# Patient Record
Sex: MALE | Race: WHITE | HISPANIC OR LATINO | Employment: UNEMPLOYED | URBAN - METROPOLITAN AREA
[De-identification: names, ages, dates, MRNs, and addresses within clinical notes are randomized per-mention and may not be internally consistent; named-entity substitution may affect disease eponyms.]

---

## 2020-06-03 ENCOUNTER — HOSPITAL ENCOUNTER (EMERGENCY)
Facility: HOSPITAL | Age: 55
Discharge: HOME | End: 2020-06-03
Attending: EMERGENCY MEDICINE

## 2020-06-03 VITALS
OXYGEN SATURATION: 99 % | WEIGHT: 163 LBS | SYSTOLIC BLOOD PRESSURE: 143 MMHG | HEART RATE: 96 BPM | RESPIRATION RATE: 16 BRPM | BODY MASS INDEX: 26.2 KG/M2 | TEMPERATURE: 99 F | HEIGHT: 66 IN | DIASTOLIC BLOOD PRESSURE: 78 MMHG

## 2020-06-03 DIAGNOSIS — M79.673 FOOT PAIN: ICD-10-CM

## 2020-06-03 DIAGNOSIS — S99.921A INJURY OF RIGHT FOOT, INITIAL ENCOUNTER: Primary | ICD-10-CM

## 2020-06-03 DIAGNOSIS — M25.579 ANKLE PAIN: ICD-10-CM

## 2020-06-03 LAB
HCV AB SERPL QL IA: NEGATIVE
HIV 1+2 AB+HIV1 P24 AG SERPL QL IA: NEGATIVE

## 2020-06-03 PROCEDURE — 86703 HIV-1/HIV-2 1 RESULT ANTBDY: CPT

## 2020-06-03 PROCEDURE — 36415 COLL VENOUS BLD VENIPUNCTURE: CPT

## 2020-06-03 PROCEDURE — 86803 HEPATITIS C AB TEST: CPT

## 2020-06-03 PROCEDURE — 25000003 PHARM REV CODE 250: Performed by: NURSE PRACTITIONER

## 2020-06-03 PROCEDURE — 29515 APPLICATION SHORT LEG SPLINT: CPT | Mod: RT

## 2020-06-03 PROCEDURE — 99283 EMERGENCY DEPT VISIT LOW MDM: CPT | Mod: 25

## 2020-06-03 RX ORDER — HYDROCODONE BITARTRATE AND ACETAMINOPHEN 10; 325 MG/1; MG/1
1 TABLET ORAL EVERY 6 HOURS PRN
Status: DISCONTINUED | OUTPATIENT
Start: 2020-06-03 | End: 2020-06-03 | Stop reason: HOSPADM

## 2020-06-03 RX ORDER — TRAMADOL HYDROCHLORIDE 50 MG/1
50 TABLET ORAL EVERY 6 HOURS PRN
Qty: 12 TABLET | Refills: 0 | Status: SHIPPED | OUTPATIENT
Start: 2020-06-03

## 2020-06-03 RX ADMIN — HYDROCODONE BITARTRATE AND ACETAMINOPHEN 1 TABLET: 10; 325 TABLET ORAL at 01:06

## 2020-06-03 NOTE — ED NOTES
Leg splint placed by Ko SHAHID & pt was educated on use of crutches. Pt demonstrated good understanding and ability to walk properly with crutches.

## 2020-06-03 NOTE — ED PROVIDER NOTES
HISTORY     Chief Complaint   Patient presents with    Foot Injury     pt reports sliding off roof, and landed in a standing position but R foot landed on big rock. swelling noted,      Review of patient's allergies indicates:  No Known Allergies     HPI   The history is provided by the patient. No  was used.   Foot Injury    The incident occurred at home. The injury mechanism was a fall (pt reports fell 8 foot and landed on feet and is having right foot and ankle pain). The incident occurred two days ago. Pain location: right foot and right ankle. The quality of the pain is described as aching. The pain is at a severity of 9/10. The pain has been constant since onset. Associated symptoms include inability to bear weight and loss of motion (decreased rom right foot and ankle). Pertinent negatives include no numbness, no muscle weakness, no loss of sensation and no tingling. He reports no foreign bodies present. He has tried nothing for the symptoms.        PCP: Primary Doctor No     Past Medical History:  History reviewed. No pertinent past medical history.     Past Surgical History:  Past Surgical History:   Procedure Laterality Date    KNEE ARTHROSCOPY Left         Family History:  History reviewed. No pertinent family history.     Social History:  Social History     Tobacco Use    Smoking status: Current Every Day Smoker     Types: Cigarettes   Substance and Sexual Activity    Alcohol use: Yes     Frequency: 2-3 times a week    Drug use: Yes     Types: Marijuana     Comment: last used 1 week ago    Sexual activity: Not Currently         ROS   Review of Systems   Constitutional: Negative for chills, fatigue and fever.   HENT: Negative for congestion and sore throat.    Respiratory: Negative for chest tightness and shortness of breath.    Cardiovascular: Negative for chest pain.   Gastrointestinal: Negative for nausea.   Genitourinary: Negative for dysuria.   Musculoskeletal: Positive  for arthralgias (right foot and right ankle). Negative for back pain.   Skin: Negative for rash.   Neurological: Negative for dizziness, tingling, weakness and numbness.   Hematological: Does not bruise/bleed easily.   Psychiatric/Behavioral: Negative for agitation.       PHYSICAL EXAM     Initial Vitals [06/03/20 1142]   BP Pulse Resp Temp SpO2   (!) 143/78 104 18 98.6 °F (37 °C) 100 %      MAP       --           Physical Exam    Nursing note and vitals reviewed.  Constitutional: He appears well-developed and well-nourished. He is not diaphoretic. No distress.   HENT:   Head: Normocephalic and atraumatic.   Right Ear: External ear normal.   Left Ear: External ear normal.   Mouth/Throat: Oropharynx is clear and moist. No oropharyngeal exudate.   Eyes: Conjunctivae are normal. Right eye exhibits no discharge. Left eye exhibits no discharge.   Neck: Normal range of motion. Neck supple.   Cardiovascular: Normal rate, regular rhythm and normal heart sounds.   Pulmonary/Chest: Breath sounds normal. No respiratory distress. He has no wheezes. He has no rhonchi. He has no rales.   Abdominal: Soft. Bowel sounds are normal.   Musculoskeletal:   Moderate swelling to right ankle and dorsal aspect right foot. Tenderness to lateral and medial right ankle. Ankle dorsiflexion and planar flexion intact. DP and PT pulses 2+. Cap refill <2sec. RLE NVI   Neurological: He is alert and oriented to person, place, and time. He has normal strength.   Skin: Skin is warm and dry.   Psychiatric: He has a normal mood and affect. His behavior is normal. Thought content normal.          ED COURSE   Orthopedic Injury  Date/Time: 6/3/2020 12:54 PM  Performed by: Oren Montoya NP  Authorized by: Alisia Kelly, DO     Consent Done?:  Yes  Universal Protocol:     Verbal consent obtained?: Yes      Written consent obtained?: No      Risks and benefits: Risks, benefits and alternatives were discussed      Consent given by:  Patient     "Patient states understanding of procedure being performed: Yes      Patient's understanding of procedure matches consent: Yes      Procedure consent matches procedure scheduled: Yes      Patient identity confirmed:  Name  Injury:     Injury location: right foot and right ankle.      Pre-procedure assessment:     Neurovascular status: Neurovascularly intact      Distal perfusion: normal      Neurological function: normal      Range of motion: normal      Local anesthesia used?: No      Patient sedated?: No        Selections made in this section will also lock the Injury type section above.:     Immobilization:  Splint    Splint type:  Short leg (right)    Supplies used:  Elastic bandage, cotton padding and Ortho-Glass  Post-procedure assessment:     Neurovascular status: Neurovascularly intact      Distal perfusion: normal      Neurological function: normal      Range of motion: normal      Patient tolerance:  Patient tolerated the procedure well with no immediate complications      ED ONGOING VITALS:  Vitals:    06/03/20 1142   BP: (!) 143/78   Pulse: 104   Resp: 18   Temp: 98.6 °F (37 °C)   TempSrc: Oral   SpO2: 100%   Weight: 73.9 kg (163 lb)   Height: 5' 6" (1.676 m)         ABNORMAL LAB VALUES:  Labs Reviewed   HIV 1 / 2 ANTIBODY   HEPATITIS C ANTIBODY         ALL LAB VALUES:  none    RADIOLOGY STUDIES:  Imaging Results          X-Ray Ankle Complete Right (Final result)  Result time 06/03/20 12:41:55    Final result by Jair Schroeder MD (06/03/20 12:41:55)                 Impression:      No acute fracture or dislocation.  See findings above.      Electronically signed by: Jair Schroeder MD  Date:    06/03/2020  Time:    12:41             Narrative:    EXAMINATION:  XR ANKLE COMPLETE 3 VIEW RIGHT; XR FOOT COMPLETE 3 VIEW RIGHT    CLINICAL HISTORY:  XR ANKLE COMPLETE 3 VIEW RIGHT; XR FOOT COMPLETE 3 VIEW RIGHTPain in unspecified ankle and joints of unspecified foot; Pain in unspecified " foot    COMPARISON:  None    FINDINGS:  Three views of the right ankle and foot were obtained.    No evidence of acute fracture or dislocation.  Bony mineralization is normal.  Moderate soft tissue swelling.  Joint effusion suspected.  Ankle mortise is intact.                               X-Ray Foot Complete Right (Final result)  Result time 06/03/20 12:41:55    Final result by Jair Schroeder MD (06/03/20 12:41:55)                 Impression:      No acute fracture or dislocation.  See findings above.      Electronically signed by: Jair Schroeder MD  Date:    06/03/2020  Time:    12:41             Narrative:    EXAMINATION:  XR ANKLE COMPLETE 3 VIEW RIGHT; XR FOOT COMPLETE 3 VIEW RIGHT    CLINICAL HISTORY:  XR ANKLE COMPLETE 3 VIEW RIGHT; XR FOOT COMPLETE 3 VIEW RIGHTPain in unspecified ankle and joints of unspecified foot; Pain in unspecified foot    COMPARISON:  None    FINDINGS:  Three views of the right ankle and foot were obtained.    No evidence of acute fracture or dislocation.  Bony mineralization is normal.  Moderate soft tissue swelling.  Joint effusion suspected.  Ankle mortise is intact.                                          The above vital signs and test results have been reviewed by the emergency provider.     ED Medications:  There are no discharge medications for this patient.    Discharge Medications:  New Prescriptions    TRAMADOL (ULTRAM) 50 MG TABLET    Take 1 tablet (50 mg total) by mouth every 6 (six) hours as needed for Pain.      Follow-up Information     Schedule an appointment as soon as possible for a visit  with [facility].    Specialty:  Orthopedics  Contact information:  [id number] [facility] [id number] [phone]                1:02 PM    I discussed with patient and/or family/caretaker that evaluation in the ED does not suggest any emergent or life threatening medical conditions requiring immediate intervention beyond what was provided in the  ED, and I believe patient is safe for discharge. Regardless, an unremarkable evaluation in the ED does not preclude the development or presence of a serious or life threatening condition. As such, patient was instructed to return immediately for any worsening or change in current symptoms.    Pre-hypertension/Hypertension: The pt has been informed that they may have pre-hypertension or hypertension based on a blood pressure reading in the ED. I recommend that the pt call the PCP listed on their discharge instructions or a physician of their choice this week to arrange f/u for further evaluation of possible pre-hypertension or hypertension.       MEDICAL DECISION MAKING                 CLINICAL IMPRESSION       ICD-10-CM ICD-9-CM   1. Injury of right foot, initial encounter S99.921A 959.7   2. Ankle pain M25.579 719.47   3. Foot pain M79.673 729.5       Disposition:   Disposition: Discharged  Condition: Stable         Oren Montoya NP  06/03/20 1303